# Patient Record
Sex: FEMALE | Race: WHITE | HISPANIC OR LATINO | URBAN - METROPOLITAN AREA
[De-identification: names, ages, dates, MRNs, and addresses within clinical notes are randomized per-mention and may not be internally consistent; named-entity substitution may affect disease eponyms.]

---

## 2017-09-01 ENCOUNTER — ALLSCRIPTS OFFICE VISIT (OUTPATIENT)
Dept: OTHER | Facility: OTHER | Age: 22
End: 2017-09-01

## 2018-01-11 NOTE — MISCELLANEOUS
Message  Return to work or school:   Drew Estrada is under my professional care  She was seen in my office on 02/29/2016     She is able to return to school on 03/01/2016     Katie Narvaez, [address]        Signatures   Electronically signed by : Rainell Lennox; Feb 29 2016 11:32AM EST                       (Author)

## 2018-01-13 VITALS
OXYGEN SATURATION: 98 % | BODY MASS INDEX: 24.49 KG/M2 | SYSTOLIC BLOOD PRESSURE: 120 MMHG | HEIGHT: 66 IN | DIASTOLIC BLOOD PRESSURE: 78 MMHG | WEIGHT: 152.38 LBS | HEART RATE: 85 BPM